# Patient Record
Sex: MALE | Race: WHITE
[De-identification: names, ages, dates, MRNs, and addresses within clinical notes are randomized per-mention and may not be internally consistent; named-entity substitution may affect disease eponyms.]

---

## 2019-11-01 ENCOUNTER — HOSPITAL ENCOUNTER (EMERGENCY)
Dept: HOSPITAL 74 - JER | Age: 44
LOS: 1 days | Discharge: HOME | End: 2019-11-02
Payer: COMMERCIAL

## 2019-11-01 VITALS — SYSTOLIC BLOOD PRESSURE: 145 MMHG | DIASTOLIC BLOOD PRESSURE: 86 MMHG | HEART RATE: 71 BPM | TEMPERATURE: 99 F

## 2019-11-01 VITALS — BODY MASS INDEX: 27.3 KG/M2

## 2019-11-01 DIAGNOSIS — Z87.19: ICD-10-CM

## 2019-11-01 DIAGNOSIS — Z87.891: ICD-10-CM

## 2019-11-01 DIAGNOSIS — Z87.11: ICD-10-CM

## 2019-11-01 DIAGNOSIS — Z87.09: ICD-10-CM

## 2019-11-01 DIAGNOSIS — R51: Primary | ICD-10-CM

## 2019-11-01 LAB
BASOPHILS # BLD: 0.7 % (ref 0–2)
DEPRECATED RDW RBC AUTO: 13.8 % (ref 11.9–15.9)
EOSINOPHIL # BLD: 2.8 % (ref 0–4.5)
HCT VFR BLD CALC: 42.5 % (ref 35.4–49)
HGB BLD-MCNC: 14.3 GM/DL (ref 11.7–16.9)
LYMPHOCYTES # BLD: 42.9 % (ref 8–40)
MCH RBC QN AUTO: 28.9 PG (ref 25.7–33.7)
MCHC RBC AUTO-ENTMCNC: 33.5 G/DL (ref 32–35.9)
MCV RBC: 86.2 FL (ref 80–96)
MONOCYTES # BLD AUTO: 8.3 % (ref 3.8–10.2)
NEUTROPHILS # BLD: 45.3 % (ref 42.8–82.8)
PLATELET # BLD AUTO: 176 K/MM3 (ref 134–434)
PMV BLD: 8.9 FL (ref 7.5–11.1)
RBC # BLD AUTO: 4.94 M/MM3 (ref 4–5.6)
WBC # BLD AUTO: 6.5 K/MM3 (ref 4–10)

## 2019-11-01 NOTE — PDOC
History of Present Illness





- General


Chief Complaint: Headache


Stated Complaint: HEADACHE/HBP


Time Seen by Provider: 11/01/19 22:17


History Source: Patient





- History of Present Illness


Initial Comments: 


11/01/19 22:40





43 yo M PMH Crohn's disease, asthma, PUD, one episode of pancreatitis 2/2 

alcohol, 20 years smoking history (quit 5 months ago), presenting with HA. 

Reports that it has been present for the last 5 days, occipital, improves with 

acetaminophen but then comes right back, associated with intermittent blurry 

vision. Does state that on Tuesday he felt a sharp chest pain, but it resolved 

in minutes and has not come back. Denies current CP, SOB, N/V, fevers/chills, 

constipation/diarrhea.





Repeatedly states how stressed he has been recently. His mother has Parkinson's 

and his father is currently hospitalized, he has two young children at home.








Past History





- Past Medical History


Allergies/Adverse Reactions: 


 Allergies











Allergy/AdvReac Type Severity Reaction Status Date / Time


 


No Known Allergies Allergy   Verified 11/01/19 22:11











Home Medications: 


Ambulatory Orders





NK [No Known Home Medication]  11/01/19 








COPD: No





- Psycho Social/Smoking Cessation Hx


Smoking History: Former smoker


Have you smoked in the past 12 months: Yes


Information on smoking cessation initiated: Yes


Hx Alcohol Use: No


Drug/Substance Use Hx: No





**Review of Systems





- Review of Systems


Able to Perform ROS?: Yes


Constitutional: No: Chills, Diaphoresis, Fever, Weakness


HEENTM: Yes: Blurred Vision (intermittent).  No: Hearing Loss, Difficulty 

Swallowing


Respiratory: No: Cough, Orthopnea, Shortness of Breath


Cardiac (ROS): Yes: Chest Pain (Tuesday, not since)


ABD/GI: No: Constipated, Diarrhea, Nausea, Vomiting


: No: Burning, Dysuria, Discharge, Frequency, Flank Pain


Musculoskeletal: No: Back Pain, Muscle Weakness


Neurological: Yes: Headache (occipital).  No: Numbness, Tingling, Tremors, 

Weakness





*Physical Exam





- Vital Signs


 Last Vital Signs











Temp Pulse Resp BP Pulse Ox


 


 99 F   71   19   145/86   99 


 


 11/01/19 22:08  11/01/19 22:08  11/01/19 22:08  11/01/19 22:08  11/01/19 22:08














- Physical Exam


Comments: 


11/01/19 22:53


Gen: well-developed, well-nourished, NAD


Neuro: AAOX4, CN II-XII intact, FTN intact, EOMI, PERRLA, 5/5 strength, SILT


HEENT: atraumatic, normocephalic


Neck: trachea midline, supple


CV: regular rate, regular rhythm, no murmurs, rubs, or gallops


Pulm: CTA b/l, no wheezing


Abd: soft, non-distended, non-tender


MSK: full ROM, intact pulses


Extr: no edema, no deformities


Skin: warm, dry








ED Treatment Course





- LABORATORY


CBC & Chemistry Diagram: 


 11/01/19 23:25





 11/01/19 23:25





Medical Decision Making





- Medical Decision Making


11/01/19 22:53


Headache with no focal deficits in s/o increased stress. Has reportedly taken 

4000mg acetaminophen a day.


- CBC, CMP


- Reglan PO


- reassess





11/01/19 23:52


CT scan with no acute pathology.





11/02/19 00:15


Cr 1.6, will encourage more fluid intake. Will dc for further outpatient workup.








Discharge





- Discharge Information


Problems reviewed: Yes


Clinical Impression/Diagnosis: 


 Headache








- Follow up/Referral





- Patient Discharge Instructions


Patient Printed Discharge Instructions:  DI for Headache


Additional Instructions: 


You were seen with headache. Your labs were unconcerning, and your CT scan did 

not show any concerning findings. Follow up with your primary care doctor 

within one week. Return to the ED if you develop worsening symptoms.





- Post Discharge Activity

## 2019-11-01 NOTE — PDOC
Documentation entered by Surinder Pickens SCRIBE, acting as scribe for Holden Lopez MD.








Holden Lopez MD:  This documentation has been prepared by the Celio murry Daniel, SCRIBE, under my direction and personally reviewed by me in its 

entirety.  I confirm that the documentation accurately reflects all work, 

treatment, procedures, and medical decision making performed by me.  





Attending Attestation





- Resident


Resident Name: RamirezTusharmaya





- ED Attending Attestation


I have performed the following: I have examined & evaluated the patient, The 

case was reviewed & discussed with the resident, I agree w/resident's findings 

& plan





- HPI


HPI: 





11/01/19 22:40


The patient is a 44 year old male with a past medical history of asthma, 

pancreatitis, and peptic ulcer disease here today for evaluation of headache. 

The patient reports that he has had a constant, pressure like, frontal headache 

that radiates to the back of his head and down his neck for the past 5 days and 

saw both his PCP and cardiologist. His cardiologist told him that his BP was 

not high enough to cause a headache but told him to come in for a CT if he is 

concerned. He also notes one episode of a weird chest sensation that resolved 

after few minutes and never happened again. He states tylenol provides some 

relief and also notes intermittent blurry vision. 


 


Patient denies lightheadedness. Denies fever, chills. Denies shortness of 

breath. Denies nausea, vomiting, diarrhea, abdominal pain. 





Allergies: NKA


Surgical history: none


Social history: 20 years smoking history, quit 5 months ago.








- Physicial Exam


PE: 





11/01/19 23:05


GENERAL: The patient is awake, alert, and fully oriented, in no acute distress.


HEAD: Normal with no signs of trauma.


EYES: Pupils equal, round and reactive to light, extraocular movements intact, 

sclera anicteric, conjunctiva clear with no pallor.


ENT: Ears normal, nares patent, oropharynx clear without exudates.  Moist 

mucous membranes.


NECK: Normal range of motion, supple without lymphadenopathy, JVD, or masses.


LUNGS: Breath sounds equal, clear to auscultation bilaterally.  No wheeze/

crackles.


HEART: Regular rate and rhythm, normal S1 and S2 without murmur or rub.


ABDOMEN: Soft/nontender/nondistended. BS wnl.  No guarding or rebound.  No 

palpable masses. No hepatosplenomegaly.


EXTREMITIES: Normal range of motion, no edema.  No clubbing or cyanosis. No 

cords, erythema, or tenderness.


NEURO:


Mental status: The patient is alert and oriented x3.


Cranial nerves: Cranial nerves II through XII are intact


Motor: The upper extremities are 5 over 5 in all muscle groups. The lower 

extremities are 5 over 5 in all muscle groups. No pronator drift.


Sensation: Sensation is intact to light touch throughout.


Cerebellar: Finger-finger-nose is normal in both upper extremities. Heel-knee-

shin is normal in both lower extremities.


Reflexes: 2+ and symmetric in the upper and lower extremities.


Gait: Normal. Heel and toe walking are normal. Tandem gait is normal.


PSYCH: Normal mood, normal affect.


SKIN: +discoloration of fingernails. Warm, Dry, normal turgor, no rashes or 

lesions noted.








- Medical Decision Making





11/01/19 23:07


A portion of this note was documented by scribe services under my direction. I 

have reviewed the details of the note, within reason, and agree with the 

documentation with the following case summary and management plan written by me.





44-year-old male with no significant past medical history other than one 

episode of pancreatitis,  smoking history quit 5 months ago, presents now with 

several days of persistent headache only temporarily relieved with Tylenol, 

associated with some intermittent blurry vision but no loss of vision/diplopia/

vision change/speech change/nausea/vomiting/no focal deficit.  No fevers or 

chills, had a single episode of atypical chest discomfort at rest, otherwise at 

baseline has no exercise limitations and actively attends the gym.





Vitals as noted, blood pressure 140 systolic


Patient is seated comfortably in stretcher, texting on his cell phone and 

having a conversation


Exam as noted with normal neurological and cardiopulmonary exams





44-year-old male with mild headache for 3 to 4 days, slightly elevated blood 

pressure, otherwise neurologically intact without red flags on history or 

physical exam.  No cardiopulmonary complaints here, is well-appearing.


Labs sent


Patient presents for CT of his head referred by his PCP from [facility], will check CT head without contrast given worsening of his 

baseline headache pattern, neurological exam is normal


Trial of Reglan orally


If above is within normal limits can discharge to outpatient follow-up





**Heart Score/ECG Review





- History


History: Slightly suspicious





- Electrocardiogram


EKG: Normal





- Age


Age: </= 45





- Risk Factors


Based on the list above the patient has:: 1-2 risk factors





- Troponin


Troponin: </= normal limit





- Score


Heart Score - Total: 1


  ** #1


ECG reviewed & interpreted by me at: 22:05


General ECG Interpretation: Sinus Rhythm, Normal Rate (62), Normal Intervals (

qtc 424), No acute ischemic changes

## 2019-11-02 LAB
ALBUMIN SERPL-MCNC: 3.9 G/DL (ref 3.4–5)
ALP SERPL-CCNC: 67 U/L (ref 45–117)
ALT SERPL-CCNC: 50 U/L (ref 13–61)
ANION GAP SERPL CALC-SCNC: 5 MMOL/L (ref 8–16)
AST SERPL-CCNC: 27 U/L (ref 15–37)
BILIRUB SERPL-MCNC: 0.3 MG/DL (ref 0.2–1)
BUN SERPL-MCNC: 21.3 MG/DL (ref 7–18)
CALCIUM SERPL-MCNC: 8.9 MG/DL (ref 8.5–10.1)
CHLORIDE SERPL-SCNC: 107 MMOL/L (ref 98–107)
CO2 SERPL-SCNC: 29 MMOL/L (ref 21–32)
CREAT SERPL-MCNC: 1.6 MG/DL (ref 0.55–1.3)
GLUCOSE SERPL-MCNC: 95 MG/DL (ref 74–106)
MAGNESIUM SERPL-MCNC: 2.2 MG/DL (ref 1.8–2.4)
POTASSIUM SERPLBLD-SCNC: 4.2 MMOL/L (ref 3.5–5.1)
PROT SERPL-MCNC: 6.8 G/DL (ref 6.4–8.2)
SODIUM SERPL-SCNC: 141 MMOL/L (ref 136–145)

## 2019-11-04 NOTE — EKG
Test Reason : 

Blood Pressure : ***/*** mmHG

Vent. Rate : 062 BPM     Atrial Rate : 062 BPM

   P-R Int : 162 ms          QRS Dur : 082 ms

    QT Int : 418 ms       P-R-T Axes : 073 064 037 degrees

   QTc Int : 424 ms

 

NORMAL SINUS RHYTHM

NORMAL ECG

NO PREVIOUS ECGS AVAILABLE

Confirmed by YENNY HERBERT MD ([id number]) on 11/4/2019 10:56:00 AM

 

Referred By:             Confirmed By:YENNY HERBERT MD